# Patient Record
Sex: FEMALE | Race: OTHER | HISPANIC OR LATINO
[De-identification: names, ages, dates, MRNs, and addresses within clinical notes are randomized per-mention and may not be internally consistent; named-entity substitution may affect disease eponyms.]

---

## 2019-07-08 PROBLEM — Z00.00 ENCOUNTER FOR PREVENTIVE HEALTH EXAMINATION: Status: ACTIVE | Noted: 2019-07-08

## 2019-08-14 ENCOUNTER — RESULT REVIEW (OUTPATIENT)
Age: 24
End: 2019-08-14

## 2019-08-15 ENCOUNTER — APPOINTMENT (OUTPATIENT)
Dept: GASTROENTEROLOGY | Facility: HOSPITAL | Age: 24
End: 2019-08-15

## 2019-08-20 ENCOUNTER — INBOUND DOCUMENT (OUTPATIENT)
Age: 24
End: 2019-08-20

## 2019-09-10 ENCOUNTER — APPOINTMENT (OUTPATIENT)
Dept: GASTROENTEROLOGY | Facility: CLINIC | Age: 24
End: 2019-09-10
Payer: SELF-PAY

## 2019-09-10 VITALS
DIASTOLIC BLOOD PRESSURE: 64 MMHG | SYSTOLIC BLOOD PRESSURE: 102 MMHG | BODY MASS INDEX: 31.04 KG/M2 | HEIGHT: 59 IN | WEIGHT: 154 LBS | HEART RATE: 63 BPM

## 2019-09-10 DIAGNOSIS — R74.8 ABNORMAL LEVELS OF OTHER SERUM ENZYMES: ICD-10-CM

## 2019-09-10 DIAGNOSIS — K29.70 GASTRITIS, UNSPECIFIED, W/OUT BLEEDING: ICD-10-CM

## 2019-09-10 DIAGNOSIS — B96.81 GASTRITIS, UNSPECIFIED, W/OUT BLEEDING: ICD-10-CM

## 2019-09-10 PROCEDURE — 99204 OFFICE O/P NEW MOD 45 MIN: CPT | Mod: 25

## 2019-09-10 PROCEDURE — 36415 COLL VENOUS BLD VENIPUNCTURE: CPT

## 2019-09-10 NOTE — ASSESSMENT
[FreeTextEntry1] : Will check liver enzymes today.\par \par Will treat her H. 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

## 2019-09-10 NOTE — HISTORY OF PRESENT ILLNESS
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  Initially found to have cholelithiasis approximately 1 month prior to admission, was scheduled to have an out-pt cholecystectomy with Dr. Barbour 8/19, was having RUQ pain with subjective jaundice, was sent to the ED by Dr. Barbour.  She underwent an EGD/EUS on 8/15/19 showing no evidence of choledocholithiasis, gastritis present, H. pylori confirmed on gastric biopsies.\par \par Liver enzymes 8/14:\par TB 0.7\par \par \par Alk phos 123\par \par Liver enzymes 8/15:\par TB 0.5\par \par \par Alk phos 154\par \par MRCP on 8/14:\par IMPRESSION:\par Cholelithiasis without further evidence for cholecystitis. No visualized choledocholithiasis.\par \par Cholecystectomy on 8/17, GB pathology:'\par FINAL DIAGNOSIS\par Gallbladder, cholecystectomy:\par - Chronic cholecystitis, cholesterolosis and cholelithiasis.\par \par Since discharge, she has felt well.  No abdominal pain, no N/V, no jaundice, has a normal brown BM daily.  Otherwise feels well.\par \par Of note, she was recently found to have R eye toxoplasmosis, was started on Bactrim for this, which she continues to take.

## 2019-09-11 LAB
ALBUMIN SERPL ELPH-MCNC: 5 G/DL
ALP BLD-CCNC: 75 U/L
ALT SERPL-CCNC: 33 U/L
ANION GAP SERPL CALC-SCNC: 13 MMOL/L
AST SERPL-CCNC: 28 U/L
BILIRUB SERPL-MCNC: 0.2 MG/DL
BUN SERPL-MCNC: 13 MG/DL
CALCIUM SERPL-MCNC: 9.5 MG/DL
CHLORIDE SERPL-SCNC: 102 MMOL/L
CO2 SERPL-SCNC: 21 MMOL/L
CREAT SERPL-MCNC: 0.95 MG/DL
GLUCOSE SERPL-MCNC: 77 MG/DL
POTASSIUM SERPL-SCNC: 4.2 MMOL/L
PROT SERPL-MCNC: 7.9 G/DL
SODIUM SERPL-SCNC: 136 MMOL/L

## 2019-12-03 ENCOUNTER — APPOINTMENT (OUTPATIENT)
Dept: OBGYN | Facility: CLINIC | Age: 24
End: 2019-12-03

## 2024-01-30 ENCOUNTER — TRANSCRIPTION ENCOUNTER (OUTPATIENT)
Age: 29
End: 2024-01-30

## 2024-02-01 ENCOUNTER — TRANSCRIPTION ENCOUNTER (OUTPATIENT)
Age: 29
End: 2024-02-01